# Patient Record
Sex: MALE | Race: WHITE | HISPANIC OR LATINO | Employment: FULL TIME | URBAN - METROPOLITAN AREA
[De-identification: names, ages, dates, MRNs, and addresses within clinical notes are randomized per-mention and may not be internally consistent; named-entity substitution may affect disease eponyms.]

---

## 2018-09-11 ENCOUNTER — OFFICE VISIT (OUTPATIENT)
Dept: URGENT CARE | Facility: PHYSICIAN GROUP | Age: 33
End: 2018-09-11
Payer: COMMERCIAL

## 2018-09-11 VITALS
RESPIRATION RATE: 18 BRPM | HEART RATE: 64 BPM | WEIGHT: 212 LBS | BODY MASS INDEX: 29.68 KG/M2 | OXYGEN SATURATION: 99 % | DIASTOLIC BLOOD PRESSURE: 74 MMHG | HEIGHT: 71 IN | SYSTOLIC BLOOD PRESSURE: 122 MMHG | TEMPERATURE: 98.6 F

## 2018-09-11 DIAGNOSIS — R68.89 FLU-LIKE SYMPTOMS: ICD-10-CM

## 2018-09-11 DIAGNOSIS — H66.002 ACUTE SUPPURATIVE OTITIS MEDIA OF LEFT EAR WITHOUT SPONTANEOUS RUPTURE OF TYMPANIC MEMBRANE, RECURRENCE NOT SPECIFIED: ICD-10-CM

## 2018-09-11 LAB
FLUAV+FLUBV AG SPEC QL IA: NORMAL
INT CON NEG: NEGATIVE
INT CON NEG: NEGATIVE
INT CON POS: POSITIVE
INT CON POS: POSITIVE
S PYO AG THROAT QL: NORMAL

## 2018-09-11 PROCEDURE — 99203 OFFICE O/P NEW LOW 30 MIN: CPT | Performed by: FAMILY MEDICINE

## 2018-09-11 PROCEDURE — 87804 INFLUENZA ASSAY W/OPTIC: CPT | Performed by: FAMILY MEDICINE

## 2018-09-11 PROCEDURE — 87880 STREP A ASSAY W/OPTIC: CPT | Performed by: FAMILY MEDICINE

## 2018-09-11 RX ORDER — AMOXICILLIN 500 MG/1
500 CAPSULE ORAL 3 TIMES DAILY
Qty: 30 CAP | Refills: 0 | Status: SHIPPED | OUTPATIENT
Start: 2018-09-11

## 2018-09-11 ASSESSMENT — ENCOUNTER SYMPTOMS
DIARRHEA: 0
SHORTNESS OF BREATH: 0
VOMITING: 0
FEVER: 0
CHILLS: 0
NAUSEA: 0
SORE THROAT: 1
HEADACHES: 0
MYALGIAS: 1
EYE DISCHARGE: 1
COUGH: 0
ABDOMINAL PAIN: 0

## 2018-09-11 NOTE — LETTER
September 11, 2018    To Whom It May Concern:         This is confirmation that Haroldo Bass attended an appointment with Pranay Blackwell M.D. on 9/11/18.  Please excuse him from work from 9/10-9/11.  He should be able to return to work tomorrow, 9/12/18.         If you have any questions please do not hesitate to call me at the phone number listed below.    Sincerely,          Pranay Blackwell M.D.  [phone]

## 2020-04-06 ENCOUNTER — HOSPITAL ENCOUNTER (EMERGENCY)
Facility: MEDICAL CENTER | Age: 35
End: 2020-04-06
Attending: EMERGENCY MEDICINE

## 2020-04-06 ENCOUNTER — APPOINTMENT (OUTPATIENT)
Dept: RADIOLOGY | Facility: MEDICAL CENTER | Age: 35
End: 2020-04-06
Attending: EMERGENCY MEDICINE

## 2020-04-06 VITALS
OXYGEN SATURATION: 97 % | HEART RATE: 67 BPM | RESPIRATION RATE: 20 BRPM | BODY MASS INDEX: 30.8 KG/M2 | TEMPERATURE: 97.5 F | DIASTOLIC BLOOD PRESSURE: 98 MMHG | WEIGHT: 220.02 LBS | SYSTOLIC BLOOD PRESSURE: 154 MMHG | HEIGHT: 71 IN

## 2020-04-06 DIAGNOSIS — I15.9 SECONDARY HYPERTENSION: ICD-10-CM

## 2020-04-06 DIAGNOSIS — R07.9 CHEST PAIN, UNSPECIFIED TYPE: ICD-10-CM

## 2020-04-06 LAB
ALBUMIN SERPL BCP-MCNC: 4.9 G/DL (ref 3.2–4.9)
ALBUMIN/GLOB SERPL: 1.6 G/DL
ALP SERPL-CCNC: 49 U/L (ref 30–99)
ALT SERPL-CCNC: 36 U/L (ref 2–50)
AMPHET UR QL SCN: NEGATIVE
ANION GAP SERPL CALC-SCNC: 14 MMOL/L (ref 7–16)
AST SERPL-CCNC: 25 U/L (ref 12–45)
BARBITURATES UR QL SCN: NEGATIVE
BASOPHILS # BLD AUTO: 0.6 % (ref 0–1.8)
BASOPHILS # BLD: 0.05 K/UL (ref 0–0.12)
BENZODIAZ UR QL SCN: NEGATIVE
BILIRUB SERPL-MCNC: 0.4 MG/DL (ref 0.1–1.5)
BUN SERPL-MCNC: 23 MG/DL (ref 8–22)
BZE UR QL SCN: NEGATIVE
CALCIUM SERPL-MCNC: 9.4 MG/DL (ref 8.5–10.5)
CANNABINOIDS UR QL SCN: NEGATIVE
CHLORIDE SERPL-SCNC: 101 MMOL/L (ref 96–112)
CO2 SERPL-SCNC: 24 MMOL/L (ref 20–33)
CREAT SERPL-MCNC: 0.99 MG/DL (ref 0.5–1.4)
D DIMER PPP IA.FEU-MCNC: 0.76 UG/ML (FEU) (ref 0–0.5)
EKG IMPRESSION: NORMAL
EOSINOPHIL # BLD AUTO: 0.09 K/UL (ref 0–0.51)
EOSINOPHIL NFR BLD: 1 % (ref 0–6.9)
ERYTHROCYTE [DISTWIDTH] IN BLOOD BY AUTOMATED COUNT: 35.5 FL (ref 35.9–50)
ETHANOL BLD-MCNC: <10.1 MG/DL (ref 0–10.1)
GLOBULIN SER CALC-MCNC: 3 G/DL (ref 1.9–3.5)
GLUCOSE SERPL-MCNC: 118 MG/DL (ref 65–99)
HCT VFR BLD AUTO: 45.3 % (ref 42–52)
HGB BLD-MCNC: 16.3 G/DL (ref 14–18)
IMM GRANULOCYTES # BLD AUTO: 0.02 K/UL (ref 0–0.11)
IMM GRANULOCYTES NFR BLD AUTO: 0.2 % (ref 0–0.9)
LYMPHOCYTES # BLD AUTO: 1.99 K/UL (ref 1–4.8)
LYMPHOCYTES NFR BLD: 23 % (ref 22–41)
MCH RBC QN AUTO: 30.7 PG (ref 27–33)
MCHC RBC AUTO-ENTMCNC: 36 G/DL (ref 33.7–35.3)
MCV RBC AUTO: 85.3 FL (ref 81.4–97.8)
METHADONE UR QL SCN: NEGATIVE
MONOCYTES # BLD AUTO: 0.52 K/UL (ref 0–0.85)
MONOCYTES NFR BLD AUTO: 6 % (ref 0–13.4)
NEUTROPHILS # BLD AUTO: 5.97 K/UL (ref 1.82–7.42)
NEUTROPHILS NFR BLD: 69.2 % (ref 44–72)
NRBC # BLD AUTO: 0 K/UL
NRBC BLD-RTO: 0 /100 WBC
OPIATES UR QL SCN: NEGATIVE
OXYCODONE UR QL SCN: NEGATIVE
PCP UR QL SCN: NEGATIVE
PLATELET # BLD AUTO: 197 K/UL (ref 164–446)
PMV BLD AUTO: 11.5 FL (ref 9–12.9)
POTASSIUM SERPL-SCNC: 3.6 MMOL/L (ref 3.6–5.5)
PROPOXYPH UR QL SCN: NEGATIVE
PROT SERPL-MCNC: 7.9 G/DL (ref 6–8.2)
RBC # BLD AUTO: 5.31 M/UL (ref 4.7–6.1)
SODIUM SERPL-SCNC: 139 MMOL/L (ref 135–145)
TROPONIN T SERPL-MCNC: <6 NG/L (ref 6–19)
WBC # BLD AUTO: 8.6 K/UL (ref 4.8–10.8)

## 2020-04-06 PROCEDURE — 85379 FIBRIN DEGRADATION QUANT: CPT

## 2020-04-06 PROCEDURE — 71275 CT ANGIOGRAPHY CHEST: CPT

## 2020-04-06 PROCEDURE — 80053 COMPREHEN METABOLIC PANEL: CPT

## 2020-04-06 PROCEDURE — 93005 ELECTROCARDIOGRAM TRACING: CPT | Performed by: EMERGENCY MEDICINE

## 2020-04-06 PROCEDURE — 700117 HCHG RX CONTRAST REV CODE 255: Performed by: EMERGENCY MEDICINE

## 2020-04-06 PROCEDURE — 99285 EMERGENCY DEPT VISIT HI MDM: CPT

## 2020-04-06 PROCEDURE — 80307 DRUG TEST PRSMV CHEM ANLYZR: CPT

## 2020-04-06 PROCEDURE — 93005 ELECTROCARDIOGRAM TRACING: CPT

## 2020-04-06 PROCEDURE — 84484 ASSAY OF TROPONIN QUANT: CPT

## 2020-04-06 PROCEDURE — 85025 COMPLETE CBC W/AUTO DIFF WBC: CPT

## 2020-04-06 PROCEDURE — 71045 X-RAY EXAM CHEST 1 VIEW: CPT

## 2020-04-06 RX ORDER — LISINOPRIL 10 MG/1
10 TABLET ORAL DAILY
Qty: 15 TAB | Refills: 0 | Status: SHIPPED | OUTPATIENT
Start: 2020-04-06 | End: 2023-03-31 | Stop reason: SDUPTHER

## 2020-04-06 RX ADMIN — IOHEXOL 56 ML: 350 INJECTION, SOLUTION INTRAVENOUS at 03:22

## 2020-04-06 SDOH — HEALTH STABILITY: MENTAL HEALTH: HOW OFTEN DO YOU HAVE A DRINK CONTAINING ALCOHOL?: MONTHLY OR LESS

## 2020-04-06 ASSESSMENT — LIFESTYLE VARIABLES
DO YOU DRINK ALCOHOL: NO
DOES PATIENT WANT TO STOP DRINKING: NO

## 2020-04-06 NOTE — ED TRIAGE NOTES
"Haroldo Roldan  34 y.o.  Chief Complaint   Patient presents with   • Hypertension     pt HX of HTN, per pt MD told him to stop taking Lisinipril.  Pt been off medication for 1 month.    • Chest Pressure     \"tightness\"       [facility].  Pt HX of HTN pt PCP told him to stop taking prescribed 10mg Lisinopril daily, 1 month ago.  Pt states yesterday he was \"feeling not well\" and took BP at home and it was elevated so he too his wife's dose of 20 mg table at 1300 yesterday, still not feeling well around 2330 last night and retook BP-remained lei and took another 20mg tablet.  Pt presents now Hypertensive and c/o chest tightness, slighly diaphoretic.  EKG complete, protocols ordered.     Vitals:    04/06/20 0129   BP: (!) 186/111   Pulse: 72   Resp: 18   Temp: 36.4 °C (97.5 °F)   SpO2: 97%       Triage process explained to patient, apologized for wait time, and returned to lobby.  Pt informed to notify staff of any change in condition. NAD at this time.    "

## 2020-04-06 NOTE — ED PROVIDER NOTES
"ER Provider Note     Scribed for Yonatan Hanks M.D. by Librado Angelo. 4/6/2020, 1:59 AM.    Primary Care Provider: Pcp Pt States None  Means of Arrival: Walk-in   History obtained from: Patient  History limited by: None     CHIEF COMPLAINT  Chief Complaint   Patient presents with   • Hypertension     pt HX of HTN, per pt MD told him to stop taking Lisinipril.  Pt been off medication for 1 month.    • Chest Pressure     \"tightness\"       HPI  Haroldo Roldan is a 34 y.o. male who presents to the Emergency Department for acute, moderate chest pain onset 1 day ago. Patient states that 1 day ago he began to experience chest pain described as tightness. He has a history of being previously treated with lisinopril for hypertension, so he took his BP at home and took 20 mg of his wife's lisinopril after he noticed it was high, although it provided no alleviation. He took another tablet last night but his BP still did not respond. Denies any associated leg swelling, fever, chills, or cough. He additionally birgit any drug usage or smoking.     PPE Note: I personally donned full PPE for all patient encounters during this visit, including being clean-shaven with an N95 respirator mask and gloves.     Scribe remained outside the patient's room and did not have any contact with the patient for the duration of patient encounter.     REVIEW OF SYSTEMS  See HPI for further details. All other systems are negative.     PAST MEDICAL HISTORY   has a past medical history of Hypertension.    SURGICAL HISTORY  patient denies any surgical history    SOCIAL HISTORY  Social History     Tobacco Use   • Smoking status: Never Smoker   • Smokeless tobacco: Never Used   Substance Use Topics   • Alcohol use: Yes     Alcohol/week: 1.8 oz     Types: 3 Shots of liquor per week     Frequency: Monthly or less   • Drug use: Never      Social History     Substance and Sexual Activity   Drug Use Never       FAMILY HISTORY  History " "reviewed. No pertinent family history.    CURRENT MEDICATIONS  Home Medications     Reviewed by Ching Barnes R.N. (Registered Nurse) on 04/06/20 at 0137  Med List Status: Partial   Medication Last Dose Status   amoxicillin (AMOXIL) 500 MG Cap  Active                ALLERGIES  No Known Allergies    PHYSICAL EXAM  VITAL SIGNS: BP (!) 186/111   Pulse 72   Temp 36.4 °C (97.5 °F) (Oral)   Resp 18   Ht 1.803 m (5' 11\")   Wt 99.8 kg (220 lb 0.3 oz)   SpO2 97%   BMI 30.69 kg/m²      Constitutional: Alert in no apparent distress.  HENT: No signs of trauma, Bilateral external ears normal, Nose normal.   Eyes: Pupils are equal and reactive, Conjunctiva normal, Non-icteric.   Neck: Normal range of motion, No tenderness, Supple, No stridor.   Lymphatic: No lymphadenopathy noted.   Cardiovascular: Regular rate and rhythm, no murmurs.   Thorax & Lungs: Normal breath sounds, No respiratory distress, No wheezing, No chest tenderness.   Abdomen: Bowel sounds normal, Soft, No tenderness, No masses, No pulsatile masses. No peritoneal signs.  Skin: Warm, Dry, No erythema, No rash.   Back: No bony tenderness, No CVA tenderness.   Extremities: Intact distal pulses, No edema, No tenderness, No cyanosis.  Musculoskeletal: Good range of motion in all major joints. No tenderness to palpation or major deformities noted.   Neurologic: Alert , Normal motor function, Normal sensory function, No focal deficits noted.   Psychiatric: Affect normal, Judgment normal, Mood normal.     DIAGNOSTIC STUDIES / PROCEDURES    EKG Interpretation:  Interpreted by me    12 Lead EKG interpreted by me to show:  Normal sinus rhythm  Rate 74  Axis: Normal  Intervals: Normal  Normal T waves  Non-diagnostic ST elevation, likely early repolarization.  RSR' in leads V1 and V2  My impression of this EKG: Does not indicate ischemia or arrhythmia at this time.     LABS  Labs Reviewed   CBC WITH DIFFERENTIAL - Abnormal; Notable for the following components:       " Result Value    MCHC 36.0 (*)     RDW 35.5 (*)     All other components within normal limits   COMP METABOLIC PANEL - Abnormal; Notable for the following components:    Glucose 118 (*)     Bun 23 (*)     All other components within normal limits   D-DIMER - Abnormal; Notable for the following components:    D-Dimer Screen 0.76 (*)     All other components within normal limits   TROPONIN   ESTIMATED GFR   DIAGNOSTIC ALCOHOL   URINE DRUG SCREEN     All labs reviewed by me.    RADIOLOGY  CT-CTA CHEST PULMONARY ARTERY W/ RECONS   Final Result      1.  No evidence of central or large peripheral pulmonary embolism. Due to suboptimal contrast bolus timing, smaller peripheral and subsegmental pulmonary emboli cannot be excluded on the basis of this study.   2.  No acute abnormality of the chest.   3.  Hepatic steatosis.   4.  Multiple bilateral renal cysts.            DX-CHEST-PORTABLE (1 VIEW)   Final Result      Mild enlargement of the cardiomediastinal silhouette without acute cardiopulmonary abnormality.         The radiologist's interpretation of all radiological studies have been reviewed by me.    COURSE & MEDICAL DECISION MAKING  Pertinent Labs & Imaging studies reviewed. (See chart for details)    This is a 34 y.o. male that presents with hypertension as well as some mild chest pressure.  The chest pressure is going on for more than 6 hours therefore a single troponin will be adequate.  We will get a chest x-ray as well to evaluate for no pneumothorax.  We will get a d-dimer to evaluate for potential clot.  I will reassess the patient after this..     1:59 AM - Patient seen and examined at bedside. Ordered DX-chest, diagnostic alcohol, D-dimer, estimated GFR, CBC with diff, CMP, troponin, and EKG.      2:57 AM - Ordered CT-CTA chest pulmonary per elevated D-dimer.    4:14 AM - Patient was reevaluated at bedside. Discussed lab and radiology results with the patient and informed them they are reassuring. Return  precautions were discussed with the patient, and they were cleared for discharge at this time. Patient was understanding and agreeable to discharge.       The patient will return for new or worsening symptoms and is stable at the time of discharge.    No obvious ischemia on the EKG.  The d-dimer is elevated.  A CT scan of the chest is negative.  Chest x-ray is negative.  We will discharge the patient home with a refill on his hypertensive medications and follow-up.    The patient is referred to a primary physician for blood pressure management, diabetic screening, and for all other preventative health concerns.    DISPOSITION:  Patient will be discharged home in stable condition.    FOLLOW UP:  [address] [address]  [phone]  Go in 2 days        OUTPATIENT MEDICATIONS:  New Prescriptions    No medications on file       FINAL IMPRESSION  1. Chest pain, unspecified type    2. Secondary hypertension          Librado DARBY ([place]), am scribing for, and in the presence of, Yonatan Hanks M.D..    Electronically signed by: Librado Angelo ([place]), 4/6/2020    Yonatan DARBY M.D. personally performed the services described in this documentation, as scribed by Librado Angelo in my presence, and it is both accurate and complete. C.    The note accurately reflects work and decisions made by me.  Yonatan Hanks M.D.  4/6/2020  5:24 AM

## 2020-04-06 NOTE — ED NOTES
Patient ambulated to room without concern and stable gate. Patient reports that his PCP told him that he may not need his bp medication and he stopped taking it about a month ago. Patient reports headache and chest pressure that started this am.

## 2023-03-31 ENCOUNTER — HOSPITAL ENCOUNTER (EMERGENCY)
Facility: MEDICAL CENTER | Age: 38
End: 2023-03-31
Attending: EMERGENCY MEDICINE

## 2023-03-31 VITALS
WEIGHT: 225.53 LBS | RESPIRATION RATE: 20 BRPM | HEIGHT: 71 IN | HEART RATE: 101 BPM | DIASTOLIC BLOOD PRESSURE: 97 MMHG | SYSTOLIC BLOOD PRESSURE: 167 MMHG | TEMPERATURE: 99.1 F | OXYGEN SATURATION: 94 % | BODY MASS INDEX: 31.57 KG/M2

## 2023-03-31 DIAGNOSIS — I16.0 HYPERTENSIVE URGENCY: ICD-10-CM

## 2023-03-31 LAB
ALBUMIN SERPL BCP-MCNC: 4.6 G/DL (ref 3.2–4.9)
ALBUMIN/GLOB SERPL: 1.6 G/DL
ALP SERPL-CCNC: 53 U/L (ref 30–99)
ALT SERPL-CCNC: 25 U/L (ref 2–50)
AMPHET UR QL SCN: NEGATIVE
ANION GAP SERPL CALC-SCNC: 12 MMOL/L (ref 7–16)
AST SERPL-CCNC: 21 U/L (ref 12–45)
BARBITURATES UR QL SCN: NEGATIVE
BASOPHILS # BLD AUTO: 0.4 % (ref 0–1.8)
BASOPHILS # BLD: 0.05 K/UL (ref 0–0.12)
BENZODIAZ UR QL SCN: NEGATIVE
BILIRUB SERPL-MCNC: 0.5 MG/DL (ref 0.1–1.5)
BUN SERPL-MCNC: 25 MG/DL (ref 8–22)
BZE UR QL SCN: NEGATIVE
CALCIUM ALBUM COR SERPL-MCNC: 8.9 MG/DL (ref 8.5–10.5)
CALCIUM SERPL-MCNC: 9.4 MG/DL (ref 8.5–10.5)
CANNABINOIDS UR QL SCN: NEGATIVE
CHLORIDE SERPL-SCNC: 102 MMOL/L (ref 96–112)
CO2 SERPL-SCNC: 23 MMOL/L (ref 20–33)
CREAT SERPL-MCNC: 0.99 MG/DL (ref 0.5–1.4)
EKG IMPRESSION: NORMAL
EOSINOPHIL # BLD AUTO: 0.04 K/UL (ref 0–0.51)
EOSINOPHIL NFR BLD: 0.3 % (ref 0–6.9)
ERYTHROCYTE [DISTWIDTH] IN BLOOD BY AUTOMATED COUNT: 35.1 FL (ref 35.9–50)
GFR SERPLBLD CREATININE-BSD FMLA CKD-EPI: 99 ML/MIN/1.73 M 2
GLOBULIN SER CALC-MCNC: 2.9 G/DL (ref 1.9–3.5)
GLUCOSE SERPL-MCNC: 125 MG/DL (ref 65–99)
HCT VFR BLD AUTO: 42.5 % (ref 42–52)
HGB BLD-MCNC: 15.5 G/DL (ref 14–18)
IMM GRANULOCYTES # BLD AUTO: 0.05 K/UL (ref 0–0.11)
IMM GRANULOCYTES NFR BLD AUTO: 0.4 % (ref 0–0.9)
LYMPHOCYTES # BLD AUTO: 1.31 K/UL (ref 1–4.8)
LYMPHOCYTES NFR BLD: 10.9 % (ref 22–41)
MCH RBC QN AUTO: 30 PG (ref 27–33)
MCHC RBC AUTO-ENTMCNC: 36.5 G/DL (ref 33.7–35.3)
MCV RBC AUTO: 82.2 FL (ref 81.4–97.8)
METHADONE UR QL SCN: NEGATIVE
MONOCYTES # BLD AUTO: 0.51 K/UL (ref 0–0.85)
MONOCYTES NFR BLD AUTO: 4.3 % (ref 0–13.4)
NEUTROPHILS # BLD AUTO: 10.04 K/UL (ref 1.82–7.42)
NEUTROPHILS NFR BLD: 83.7 % (ref 44–72)
NRBC # BLD AUTO: 0 K/UL
NRBC BLD-RTO: 0 /100 WBC
NT-PROBNP SERPL IA-MCNC: 37 PG/ML (ref 0–125)
OPIATES UR QL SCN: NEGATIVE
OXYCODONE UR QL SCN: NEGATIVE
PCP UR QL SCN: NEGATIVE
PLATELET # BLD AUTO: 196 K/UL (ref 164–446)
PMV BLD AUTO: 11.3 FL (ref 9–12.9)
POTASSIUM SERPL-SCNC: 3.3 MMOL/L (ref 3.6–5.5)
PROPOXYPH UR QL SCN: NEGATIVE
PROT SERPL-MCNC: 7.5 G/DL (ref 6–8.2)
RBC # BLD AUTO: 5.17 M/UL (ref 4.7–6.1)
SODIUM SERPL-SCNC: 137 MMOL/L (ref 135–145)
TROPONIN T SERPL-MCNC: <6 NG/L (ref 6–19)
WBC # BLD AUTO: 12 K/UL (ref 4.8–10.8)

## 2023-03-31 PROCEDURE — 94760 N-INVAS EAR/PLS OXIMETRY 1: CPT

## 2023-03-31 PROCEDURE — 83880 ASSAY OF NATRIURETIC PEPTIDE: CPT

## 2023-03-31 PROCEDURE — 93005 ELECTROCARDIOGRAM TRACING: CPT | Performed by: EMERGENCY MEDICINE

## 2023-03-31 PROCEDURE — 99284 EMERGENCY DEPT VISIT MOD MDM: CPT

## 2023-03-31 PROCEDURE — 96374 THER/PROPH/DIAG INJ IV PUSH: CPT

## 2023-03-31 PROCEDURE — 80307 DRUG TEST PRSMV CHEM ANLYZR: CPT

## 2023-03-31 PROCEDURE — 84484 ASSAY OF TROPONIN QUANT: CPT

## 2023-03-31 PROCEDURE — 93005 ELECTROCARDIOGRAM TRACING: CPT

## 2023-03-31 PROCEDURE — 80053 COMPREHEN METABOLIC PANEL: CPT

## 2023-03-31 PROCEDURE — 36415 COLL VENOUS BLD VENIPUNCTURE: CPT

## 2023-03-31 PROCEDURE — 700111 HCHG RX REV CODE 636 W/ 250 OVERRIDE (IP): Performed by: EMERGENCY MEDICINE

## 2023-03-31 PROCEDURE — 85025 COMPLETE CBC W/AUTO DIFF WBC: CPT

## 2023-03-31 RX ORDER — HYDRALAZINE HYDROCHLORIDE 20 MG/ML
20 INJECTION INTRAMUSCULAR; INTRAVENOUS ONCE
Status: COMPLETED | OUTPATIENT
Start: 2023-03-31 | End: 2023-03-31

## 2023-03-31 RX ORDER — LISINOPRIL 10 MG/1
10 TABLET ORAL DAILY
Qty: 30 TABLET | Refills: 0 | Status: SHIPPED | OUTPATIENT
Start: 2023-03-31 | End: 2023-04-30

## 2023-03-31 RX ADMIN — HYDRALAZINE HYDROCHLORIDE 20 MG: 20 INJECTION INTRAMUSCULAR; INTRAVENOUS at 02:53

## 2023-03-31 NOTE — ED PROVIDER NOTES
ED Provider Note    CHIEF COMPLAINT  Chief Complaint   Patient presents with    Hypertension     Patient ambulates to triage c/o htn reports bp at home to be 175/115, patient has a history of high blood pressure takes hydralazine. Reports he took his medications tonight. Reports chest pain.        EXTERNAL RECORDS REVIEWED  Other previous emergency department notes    HPI/ROS  LIMITATION TO HISTORY   Select: : None  OUTSIDE HISTORIAN(S):      Haroldo Roldan is a 37 y.o. male who presents chief complaint hypertension.  Patient has a history of hypertension for which he takes hydralazine.  Was also previously on lisinopril but had stopped taking it.  Yesterday he had noticed that his blood pressure was high he took an extra one of his hydralazine he also took one of his wife's lisinopril.  Throughout the day today his pressures continue to be elevated and has had some tightness in his chest some minimal shortness of breath.  No nausea no diaphoresis no abdominal pain no problems with urination or bowel movements no other acute symptom changes or concerns.    PAST MEDICAL HISTORY   has a past medical history of Hypertension.    SURGICAL HISTORY  patient denies any surgical history    FAMILY HISTORY  No family history on file.    SOCIAL HISTORY  Social History     Tobacco Use    Smoking status: Never    Smokeless tobacco: Never   Substance and Sexual Activity    Alcohol use: Yes     Alcohol/week: 1.8 oz     Types: 3 Shots of liquor per week    Drug use: Never    Sexual activity: Not on file       CURRENT MEDICATIONS  Home Medications       Reviewed by Tia Moran R.N. (Registered Nurse) on 03/31/23 at 0143  Med List Status: Not Addressed     Medication Last Dose Status   amoxicillin (AMOXIL) 500 MG Cap  Active   hydrALAZINE (APRESOLINE) 25 MG Tab  Active   lisinopril (PRINIVIL) 10 MG Tab  Active                    ALLERGIES  No Known Allergies    PHYSICAL EXAM  VITAL SIGNS: BP (!) 192/106   Pulse 82  "  Temp 36.3 °C (97.3 °F) (Temporal)   Resp 16   Ht 1.803 m (5' 11\")   Wt 102 kg (225 lb 8.5 oz)   SpO2 94%   BMI 31.46 kg/m²    Pulse ox interpretation: I interpret this pulse ox as normal.  Constitutional: Alert and oriented x 3, minimal distress  HEENT: Atraumatic normocephalic, pupils are equal round reactive to light extraocular movements are intact. The nares is clear, external ears are normal, mouth shows moist mucous membranes normal dentition for age  Neck: Supple, no JVD no tracheal deviation  Cardiovascular: Regular rate and rhythm no murmur rub or gallop 2+ pulses peripherally x4  Thorax & Lungs: No respiratory distress, no wheezes rales or rhonchi, No chest tenderness.   GI: Soft nontender nondistended positive bowel sounds, no peritoneal signs  Skin: Warm dry no acute rash or lesion  Musculoskeletal: Moving all extremities with full range and 5 of 5 strength no acute  deformity  Neurologic: Cranial nerves III through XII are grossly intact no sensory deficit no cerebellar dysfunction   Psychiatric: Appropriate affect for situation at this time          DIAGNOSTIC STUDIES / PROCEDURES  Results for orders placed or performed during the hospital encounter of 03/31/23   CBC w/ Differential   Result Value Ref Range    WBC 12.0 (H) 4.8 - 10.8 K/uL    RBC 5.17 4.70 - 6.10 M/uL    Hemoglobin 15.5 14.0 - 18.0 g/dL    Hematocrit 42.5 42.0 - 52.0 %    MCV 82.2 81.4 - 97.8 fL    MCH 30.0 27.0 - 33.0 pg    MCHC 36.5 (H) 33.7 - 35.3 g/dL    RDW 35.1 (L) 35.9 - 50.0 fL    Platelet Count 196 164 - 446 K/uL    MPV 11.3 9.0 - 12.9 fL    Neutrophils-Polys 83.70 (H) 44.00 - 72.00 %    Lymphocytes 10.90 (L) 22.00 - 41.00 %    Monocytes 4.30 0.00 - 13.40 %    Eosinophils 0.30 0.00 - 6.90 %    Basophils 0.40 0.00 - 1.80 %    Immature Granulocytes 0.40 0.00 - 0.90 %    Nucleated RBC 0.00 /100 WBC    Neutrophils (Absolute) 10.04 (H) 1.82 - 7.42 K/uL    Lymphs (Absolute) 1.31 1.00 - 4.80 K/uL    Monos (Absolute) 0.51 0.00 " - 0.85 K/uL    Eos (Absolute) 0.04 0.00 - 0.51 K/uL    Baso (Absolute) 0.05 0.00 - 0.12 K/uL    Immature Granulocytes (abs) 0.05 0.00 - 0.11 K/uL    NRBC (Absolute) 0.00 K/uL   Complete Metabolic Panel (CMP)   Result Value Ref Range    Sodium 137 135 - 145 mmol/L    Potassium 3.3 (L) 3.6 - 5.5 mmol/L    Chloride 102 96 - 112 mmol/L    Co2 23 20 - 33 mmol/L    Anion Gap 12.0 7.0 - 16.0    Glucose 125 (H) 65 - 99 mg/dL    Bun 25 (H) 8 - 22 mg/dL    Creatinine 0.99 0.50 - 1.40 mg/dL    Calcium 9.4 8.5 - 10.5 mg/dL    AST(SGOT) 21 12 - 45 U/L    ALT(SGPT) 25 2 - 50 U/L    Alkaline Phosphatase 53 30 - 99 U/L    Total Bilirubin 0.5 0.1 - 1.5 mg/dL    Albumin 4.6 3.2 - 4.9 g/dL    Total Protein 7.5 6.0 - 8.2 g/dL    Globulin 2.9 1.9 - 3.5 g/dL    A-G Ratio 1.6 g/dL   proBrain Natriuretic Peptide, NT   Result Value Ref Range    NT-proBNP 37 0 - 125 pg/mL   Troponin - STAT Once   Result Value Ref Range    Troponin T <6 6 - 19 ng/L   ESTIMATED GFR   Result Value Ref Range    GFR (CKD-EPI) 99 >60 mL/min/1.73 m 2   CORRECTED CALCIUM   Result Value Ref Range    Correct Calcium 8.9 8.5 - 10.5 mg/dL   EKG   Result Value Ref Range    Report       [facility] Emergency Dept.    Test Date:  2023  Pt Name:    RITA NORTH         Department: ER  MRN:        [id number]                      Room:  Gender:     Male                         Technician: [id number]  :        [id number]                   Requested By:ER TRIAGE PROTOCOL  Order #:    [id number]                    Reading MD: KIM LONDON MD    Measurements  Intervals                                Axis  Rate:       80                           P:          26  UT:         162                          QRS:        1  QRSD:       107                          T:          16  QT:         377  QTc:        435    Interpretive Statements  Normal sinus rhythm at a rate of 80 there is ST elevation limited to V2 which  is  consistent from previous exam  dated 4/6/2020.  No reciprocal changes no other  ST  deviation or T wave abnormalities no other acute findings  Electronically Signed On 3- 2:16:16 PDT by KIM LONDON MD               COURSE & MEDICAL DECISION MAKING    ED Observation Status? Yes; I am placing the patient in to an observation status due to a diagnostic uncertainty as well as therapeutic intensity. Patient placed in observation status at 02:38 AM, 3/31/2023.     Observation plan is as follows: Patient will require troponin renal function panel for evaluation of end organ damage with his hypertension otherwise likely can obtain blood pressure control and follow-up with outpatient therefore he placed in ED observation status for this evaluation    Upon Reevaluation, the patient's condition has: Improved; and will be discharged.    Patient discharged from ED Observation status at 5:10 AM   (Time) 3/31/23 (Date).     INITIAL ASSESSMENT, COURSE AND PLAN  Care Narrative: Pleasant 37-year-old male who has had multiple days of hypertension.  He takes hydralazine chronically has been taking his wife's lisinopril today took 2 of her 40 mg tablets and reports still having elevated blood pressure and some tightness in his chest.  Patient previously took lisinopril however was discontinued off this by his primary care physician as his blood pressures were on the lower end.  He has had no headache no altered mental status no neck pain no abdominal pain no lower extremity pain or swelling.  Patient was given hydralazine here for IVs had reduction of blood pressure he is feeling better at this time his labs are unremarkable including negative troponin normal renal function.  Patient to follow-up with primary care given prescription for 1 month supply of lisinopril for which he previously took and discharged in stable and improved condition        ADDITIONAL PROBLEM LIST    Anxiety, follow-up with primary care    DISPOSITION AND DISCUSSIONS    I have  discussed management of the patient with the following physicians and DIONE's:      Discussion of management with other QHP or appropriate source(s): None     Escalation of care considered, and ultimately not performed:diagnostic imaging and acute inpatient care management, however at this time, the patient is most appropriate for outpatient management    Barriers to care at this time, including but not limited to: Patient cannot recall name of his primary care physician however does state that he knows the clinic for which he has gotten care from and can contact them upon discharge.     Decision tools and prescription drugs considered including, but not limited to: Heart score 1.      [place] [address] [place] [id number]  [phone]    for establishment of primary care, for blood pressure management    [facility], Emergency Dept  [address] [facility] [address] [phone]    in 12-24 hours if symptoms persist, immediately If symptoms worsen, or if you develop any other symptoms or concerns      FINAL DIAGNOSIS  1. Hypertensive urgency Active

## 2023-03-31 NOTE — ED TRIAGE NOTES
"Chief Complaint   Patient presents with    Hypertension     Patient ambulates to triage c/o htn reports bp at home to be 175/115, patient has a history of high blood pressure takes hydralazine. Reports he took his medications tonight. Reports chest pain.      BP (!) 198/117   Pulse 75   Temp 36.3 °C (97.3 °F) (Temporal)   Resp 16   Ht 1.803 m (5' 11\")   Wt 102 kg (225 lb 8.5 oz)   SpO2 95%     EKG ordered per protocol educated on ed triage process, instructed to notify staff of any new or worsening symptoms, apologized for wait times returned to ed lobby.   "

## 2023-04-03 ENCOUNTER — HOSPITAL ENCOUNTER (EMERGENCY)
Facility: MEDICAL CENTER | Age: 38
End: 2023-04-04
Attending: EMERGENCY MEDICINE

## 2023-04-03 ENCOUNTER — APPOINTMENT (OUTPATIENT)
Dept: RADIOLOGY | Facility: MEDICAL CENTER | Age: 38
End: 2023-04-03
Attending: EMERGENCY MEDICINE

## 2023-04-03 DIAGNOSIS — I10 HYPERTENSION, UNSPECIFIED TYPE: ICD-10-CM

## 2023-04-03 LAB
ALBUMIN SERPL BCP-MCNC: 4.6 G/DL (ref 3.2–4.9)
ALBUMIN/GLOB SERPL: 1.4 G/DL
ALP SERPL-CCNC: 53 U/L (ref 30–99)
ALT SERPL-CCNC: 26 U/L (ref 2–50)
ANION GAP SERPL CALC-SCNC: 13 MMOL/L (ref 7–16)
AST SERPL-CCNC: 19 U/L (ref 12–45)
BASOPHILS # BLD AUTO: 0.3 % (ref 0–1.8)
BASOPHILS # BLD: 0.03 K/UL (ref 0–0.12)
BILIRUB SERPL-MCNC: 0.8 MG/DL (ref 0.1–1.5)
BUN SERPL-MCNC: 12 MG/DL (ref 8–22)
CALCIUM ALBUM COR SERPL-MCNC: 9 MG/DL (ref 8.5–10.5)
CALCIUM SERPL-MCNC: 9.5 MG/DL (ref 8.5–10.5)
CHLORIDE SERPL-SCNC: 101 MMOL/L (ref 96–112)
CO2 SERPL-SCNC: 24 MMOL/L (ref 20–33)
CREAT SERPL-MCNC: 0.84 MG/DL (ref 0.5–1.4)
EOSINOPHIL # BLD AUTO: 0.01 K/UL (ref 0–0.51)
EOSINOPHIL NFR BLD: 0.1 % (ref 0–6.9)
ERYTHROCYTE [DISTWIDTH] IN BLOOD BY AUTOMATED COUNT: 33.7 FL (ref 35.9–50)
GFR SERPLBLD CREATININE-BSD FMLA CKD-EPI: 113 ML/MIN/1.73 M 2
GLOBULIN SER CALC-MCNC: 3.3 G/DL (ref 1.9–3.5)
GLUCOSE SERPL-MCNC: 119 MG/DL (ref 65–99)
HCT VFR BLD AUTO: 44.3 % (ref 42–52)
HGB BLD-MCNC: 16.4 G/DL (ref 14–18)
IMM GRANULOCYTES # BLD AUTO: 0.02 K/UL (ref 0–0.11)
IMM GRANULOCYTES NFR BLD AUTO: 0.2 % (ref 0–0.9)
LACTATE SERPL-SCNC: 0.9 MMOL/L (ref 0.5–2)
LYMPHOCYTES # BLD AUTO: 1.69 K/UL (ref 1–4.8)
LYMPHOCYTES NFR BLD: 17.9 % (ref 22–41)
MCH RBC QN AUTO: 30.3 PG (ref 27–33)
MCHC RBC AUTO-ENTMCNC: 37 G/DL (ref 33.7–35.3)
MCV RBC AUTO: 81.7 FL (ref 81.4–97.8)
MONOCYTES # BLD AUTO: 0.54 K/UL (ref 0–0.85)
MONOCYTES NFR BLD AUTO: 5.7 % (ref 0–13.4)
NEUTROPHILS # BLD AUTO: 7.15 K/UL (ref 1.82–7.42)
NEUTROPHILS NFR BLD: 75.8 % (ref 44–72)
NRBC # BLD AUTO: 0 K/UL
NRBC BLD-RTO: 0 /100 WBC
PLATELET # BLD AUTO: 220 K/UL (ref 164–446)
PMV BLD AUTO: 11.1 FL (ref 9–12.9)
POTASSIUM SERPL-SCNC: 3.6 MMOL/L (ref 3.6–5.5)
PROT SERPL-MCNC: 7.9 G/DL (ref 6–8.2)
RBC # BLD AUTO: 5.42 M/UL (ref 4.7–6.1)
SODIUM SERPL-SCNC: 138 MMOL/L (ref 135–145)
TROPONIN T SERPL-MCNC: <6 NG/L (ref 6–19)
WBC # BLD AUTO: 9.4 K/UL (ref 4.8–10.8)

## 2023-04-03 PROCEDURE — 83880 ASSAY OF NATRIURETIC PEPTIDE: CPT

## 2023-04-03 PROCEDURE — 84145 PROCALCITONIN (PCT): CPT

## 2023-04-03 PROCEDURE — 80053 COMPREHEN METABOLIC PANEL: CPT

## 2023-04-03 PROCEDURE — 84484 ASSAY OF TROPONIN QUANT: CPT

## 2023-04-03 PROCEDURE — 85025 COMPLETE CBC W/AUTO DIFF WBC: CPT

## 2023-04-03 PROCEDURE — 93005 ELECTROCARDIOGRAM TRACING: CPT | Performed by: EMERGENCY MEDICINE

## 2023-04-03 PROCEDURE — A9270 NON-COVERED ITEM OR SERVICE: HCPCS | Performed by: EMERGENCY MEDICINE

## 2023-04-03 PROCEDURE — 71045 X-RAY EXAM CHEST 1 VIEW: CPT

## 2023-04-03 PROCEDURE — 700102 HCHG RX REV CODE 250 W/ 637 OVERRIDE(OP): Performed by: EMERGENCY MEDICINE

## 2023-04-03 PROCEDURE — 83605 ASSAY OF LACTIC ACID: CPT

## 2023-04-03 PROCEDURE — 99284 EMERGENCY DEPT VISIT MOD MDM: CPT

## 2023-04-03 PROCEDURE — 36415 COLL VENOUS BLD VENIPUNCTURE: CPT

## 2023-04-03 PROCEDURE — 93005 ELECTROCARDIOGRAM TRACING: CPT

## 2023-04-03 RX ORDER — LISINOPRIL 10 MG/1
10 TABLET ORAL ONCE
Status: COMPLETED | OUTPATIENT
Start: 2023-04-03 | End: 2023-04-03

## 2023-04-03 RX ORDER — HYDRALAZINE HYDROCHLORIDE 25 MG/1
50 TABLET, FILM COATED ORAL EVERY 8 HOURS
Status: DISCONTINUED | OUTPATIENT
Start: 2023-04-03 | End: 2023-04-04 | Stop reason: HOSPADM

## 2023-04-03 RX ADMIN — HYDRALAZINE HYDROCHLORIDE 50 MG: 25 TABLET, FILM COATED ORAL at 22:09

## 2023-04-03 RX ADMIN — LISINOPRIL 10 MG: 10 TABLET ORAL at 22:09

## 2023-04-03 ASSESSMENT — FIBROSIS 4 INDEX: FIB4 SCORE: 0.84

## 2023-04-04 VITALS
BODY MASS INDEX: 30.9 KG/M2 | WEIGHT: 220.68 LBS | HEART RATE: 46 BPM | SYSTOLIC BLOOD PRESSURE: 177 MMHG | DIASTOLIC BLOOD PRESSURE: 106 MMHG | RESPIRATION RATE: 17 BRPM | OXYGEN SATURATION: 93 % | TEMPERATURE: 98.1 F | HEIGHT: 71 IN

## 2023-04-04 LAB
EKG IMPRESSION: NORMAL
NT-PROBNP SERPL IA-MCNC: 20 PG/ML (ref 0–125)
PROCALCITONIN SERPL-MCNC: <0.05 NG/ML

## 2023-04-04 NOTE — ED NOTES
Pt given discharge instructions regarding follow up appointments, hypertension, and reasons to return to the ER. Pt verbalized understanding and signed AVS. Discharged in stable condition from ER

## 2023-04-04 NOTE — ED PROVIDER NOTES
ED Provider Note    CHIEF COMPLAINT  Chief Complaint   Patient presents with    Hypertension     Patient reports high blood pressure though taking his medications as prescribed. Patient reports SOB and some chest pressure since yesterday afternoon.     Shortness of Breath       HPI  Haroldo Roldan is a 37 y.o. male who presents for evaluation of high blood pressure in the setting of off-and-on symptoms consisting of anxiety and shortness of breath.  Currently, the patient has no symptoms but is concerned about his blood pressure.  He notes he was seen several days ago for the same issue and prescribed lisinopril in addition to the hydralazine he is already on.  Patient's chest pressure and shortness of breath started yesterday but at some point today it went away.  He does note that he feels anxious and is checking his blood pressure several times a day.  EXTERNAL RECORDS REVIEWED  Reviewed ED visit from 31 March ROS  Constitutional: No fevers or chills  Skin: No rashes  HEENT: No sore throat, runny nose  Neck: No neck pain  Chest: No current pain or rashes  Pulm: No current shortness of breath, cough, wheezing, stridor, or pain with inspiration/expiration  Gastrointestinal: No nausea, vomiting, diarrhea, constipation, bloating, melena, hematochezia or abdominal pain.  Genitourinary: No dysuria or hematuria  Musculoskeletal: No pain, swelling, or weakness  Neurologic: No sensory or focal motor changes to extremities. No confusion or disorientation.  Heme: No bleeding or bruising problems.   Immuno: No hx of recurrent infections        LIMITATION TO HISTORY   Mexican only speaking, arrives with younger brother who translates.  OUTSIDE HISTORIAN(S):  Younger brother        PAST FAM HISTORY  History reviewed. No pertinent family history.    PAST MEDICAL HISTORY   has a past medical history of Hypertension.    SOCIAL HISTORY  Social History     Tobacco Use    Smoking status: Never    Smokeless tobacco:  "Never   Vaping Use    Vaping Use: Never used   Substance and Sexual Activity    Alcohol use: Not Currently     Alcohol/week: 1.8 oz     Types: 3 Shots of liquor per week    Drug use: Never    Sexual activity: Not on file       SURGICAL HISTORY  patient denies any surgical history    CURRENT MEDICATIONS  Home Medications       Reviewed by Florina Irene R.N. (Registered Nurse) on 04/03/23 at 1958  Med List Status: Partial     Medication Last Dose Status   amoxicillin (AMOXIL) 500 MG Cap  Active   hydrALAZINE (APRESOLINE) 25 MG Tab  Active   lisinopril (PRINIVIL) 10 MG Tab  Active                     ALLERGIES  No Known Allergies    PHYSICAL EXAM  VITAL SIGNS: BP (!) 175/101   Pulse (!) 56   Temp 36.2 °C (97.1 °F) (Temporal)   Resp 18   Ht 1.803 m (5' 11\")   Wt 100 kg (220 lb 10.9 oz)   SpO2 92%   BMI 30.78 kg/m²    Gen: Alert in no apparent distress.  HEENT: No signs of trauma, Bilateral external ears normal, Nose normal. Conjunctiva normal, Non-icteric.   Cardiovascular: Regular rate and rhythm, no murmurs.  Capillary refill less than 3 seconds to all extremities, 2+ distal pulses.  Thorax & Lungs: Normal breath sounds, No respiratory distress, No wheezing bilateral chest rise  Abdomen: Bowel sounds normal, Soft, No tenderness, No masses, No pulsatile masses. No Guarding or rebound  Skin: Warm, Dry, No erythema, No rash noted to exposed areas.   Extremities: Intact distal pulses, No edema  Neurologic: Alert , no facial droop, grossly normal coordination and strength  Psychiatric: Affect pleasant    INITIAL IMPRESSION  Patient has for evaluation of chest pain and shortness of breath in the setting of high blood pressure.  Currently the patient actually has no symptoms and he is not quite clear when the symptoms went away.  He notes significant anxiety regarding his blood pressure and his younger brother, who came with him to help translate, states that he feels he is checking it too much and making himself " even more anxious.  Currently the patient has a normal exam and is noted to have excellent pulses and perfusion.  I do not feel his chest pain was related to dissection or MI but we will obtain screening labs once again, including a troponin.  Due to patient's recent shortness of breath I feel it is reasonable to evaluate a BNP as hypertensive urgency/emergency with congestive heart failure could be an issue with his high blood pressure.  Infectious issue seems less likely as the cause of his shortness of breath as he has no other symptoms.  Regardless, we will check a procalcitonin in addition to his CBC and a CMP.  I do not feel a repeat chest x-ray is necessary at this time however if he experiences any respiratory difficulty or decrease in his oxygen saturation we will need to reconsider this to evaluate for pulmonary edema.  Patient will be placed on ED observation as I am unclear whether therapy will work or whether he will need admission due to endorgan dysfunction  LABS  Results for orders placed or performed during the hospital encounter of 04/03/23   CBC with Differential   Result Value Ref Range    WBC 9.4 4.8 - 10.8 K/uL    RBC 5.42 4.70 - 6.10 M/uL    Hemoglobin 16.4 14.0 - 18.0 g/dL    Hematocrit 44.3 42.0 - 52.0 %    MCV 81.7 81.4 - 97.8 fL    MCH 30.3 27.0 - 33.0 pg    MCHC 37.0 (H) 33.7 - 35.3 g/dL    RDW 33.7 (L) 35.9 - 50.0 fL    Platelet Count 220 164 - 446 K/uL    MPV 11.1 9.0 - 12.9 fL    Neutrophils-Polys 75.80 (H) 44.00 - 72.00 %    Lymphocytes 17.90 (L) 22.00 - 41.00 %    Monocytes 5.70 0.00 - 13.40 %    Eosinophils 0.10 0.00 - 6.90 %    Basophils 0.30 0.00 - 1.80 %    Immature Granulocytes 0.20 0.00 - 0.90 %    Nucleated RBC 0.00 /100 WBC    Neutrophils (Absolute) 7.15 1.82 - 7.42 K/uL    Lymphs (Absolute) 1.69 1.00 - 4.80 K/uL    Monos (Absolute) 0.54 0.00 - 0.85 K/uL    Eos (Absolute) 0.01 0.00 - 0.51 K/uL    Baso (Absolute) 0.03 0.00 - 0.12 K/uL    Immature Granulocytes (abs) 0.02 0.00 -  0.11 K/uL    NRBC (Absolute) 0.00 K/uL   Complete Metabolic Panel (CMP)   Result Value Ref Range    Sodium 138 135 - 145 mmol/L    Potassium 3.6 3.6 - 5.5 mmol/L    Chloride 101 96 - 112 mmol/L    Co2 24 20 - 33 mmol/L    Anion Gap 13.0 7.0 - 16.0    Glucose 119 (H) 65 - 99 mg/dL    Bun 12 8 - 22 mg/dL    Creatinine 0.84 0.50 - 1.40 mg/dL    Calcium 9.5 8.5 - 10.5 mg/dL    AST(SGOT) 19 12 - 45 U/L    ALT(SGPT) 26 2 - 50 U/L    Alkaline Phosphatase 53 30 - 99 U/L    Total Bilirubin 0.8 0.1 - 1.5 mg/dL    Albumin 4.6 3.2 - 4.9 g/dL    Total Protein 7.9 6.0 - 8.2 g/dL    Globulin 3.3 1.9 - 3.5 g/dL    A-G Ratio 1.4 g/dL   Troponins NOW   Result Value Ref Range    Troponin T <6 6 - 19 ng/L   LACTIC ACID   Result Value Ref Range    Lactic Acid 0.9 0.5 - 2.0 mmol/L   CORRECTED CALCIUM   Result Value Ref Range    Correct Calcium 9.0 8.5 - 10.5 mg/dL   ESTIMATED GFR   Result Value Ref Range    GFR (CKD-EPI) 113 >60 mL/min/1.73 m 2   proBrain Natriuretic Peptide, NT   Result Value Ref Range    NT-proBNP 20 0 - 125 pg/mL   PROCALCITONIN   Result Value Ref Range    Procalcitonin <0.05 <0.25 ng/mL   EKG   Result Value Ref Range    Report       [facility] Emergency Dept.    Test Date:  2023  Pt Name:    RITA NORTH         Department: ER  MRN:        [id number]                      Room:  Gender:     Male                         Technician: [id number]  :        1985                   Requested By:ER TRIAGE PROTOCOL  Order #:    [id number]                    Reading MD: Andrew Logan MD    Measurements  Intervals                                Axis  Rate:       61                           P:          -2  IA:         152                          QRS:        -4  QRSD:       97                           T:          11  QT:         407  QTc:        410    Interpretive Statements  Sinus rhythm  Compared to ECG 2023 01:59:13  ST (T wave) deviation no longer present  T-wave abnormality no  longer present  Electronically Signed On 4-4-2023 0:34:55 PDT by Andrew Logan MD       I have independently interpreted this EKG  Sinus rhythm rate of 61, intervals appear to be within normal limits, there are no convincing ST or T wave changes that suggest ischemia.  There is no ectopy.  There are no significant changes when compared to EKG done 4 days ago.  RADIOLOGY  DX-CHEST-PORTABLE (1 VIEW)   Final Result         1.  No acute cardiopulmonary disease.   2.  Cardiomegaly        I have independently interpreted the diagnostic imaging associated with this visit and am waiting the final reading from the radiologist.   My preliminary interpretation is a follows: Single view chest x-ray: Slightly enlarged cardiac silhouette.  No pulmonary opacities to suggest infiltrates or effusions, no bony abnormalities.        COURSE & MEDICAL DECISION MAKING  Pertinent Labs & Imaging studies reviewed. (See chart for details)  ED observation?     Yes; I am placing the patient in to an observation status due to a diagnostic uncertainty as well as therapeutic intensity. Patient placed in observation status at 10 PM, April 3, 2023  Observation plan is as follows: Diagnostic work-up, empiric treatment for hypertension in the form of hydralazine.     Upon Reevaluation, the patient's condition has: Improved; and will be discharged.  Patient reevaluated at 12:40 AM, April 4, 2023  Patient calm, conversant, and has a blood pressure of 174/101.  It appears that the hydralazine given has had some affect.  The patient does not have any symptoms.  After discussion with the patient and his brother, who is still translating, is notable that the patient does not have any findings to suggest endorgan dysfunction.  He does have a slightly enlarged cardiac silhouette suggestive of of more longstanding disease.  This alone does not warrant admission as he does not have any findings to suggest cardiac dysfunction at this time.  Patient states  understanding of this and noted that he has an appointment with his doctor tomorrow.  I feel he is safe for discharge and continuation of his current medications which includes lisinopril and hydralazine, until he can follow-up.  He states clear understanding that if his symptoms worsen or change he can return for reevaluation.  Patient discharged from ED Observation status at 12:40 AM (Time) April 4, 2023 (Date).   In addition to the chief complaint, the following problems were addressed: None    I have discussed management of the patient with the following physicians and DIONE's: None    Escalation of care considered, and ultimately not performed:acute inpatient care management, however at this time, the patient is most appropriate for outpatient management    Barriers to care at this time, including but not limited to: Language barrier.     Decision tools and Rx drugs considered including, but not limited to : Antihypertensives    Discussion of management with other QHP or appropriate source(s): None    The patient will not drink alcohol nor drive with prescribed medications. The patient will return for worsening symptoms and is stable at the time of discharge. The patient verbalizes understanding and will comply.    FINAL IMPRESSION  1. Hypertension, unspecified type        Electronically signed by: Andrew Logan M.D., 4/3/2023 9:54 PM;javi

## 2025-03-06 ENCOUNTER — OFFICE VISIT (OUTPATIENT)
Dept: URGENT CARE | Facility: PHYSICIAN GROUP | Age: 40
End: 2025-03-06

## 2025-03-06 VITALS
TEMPERATURE: 97.5 F | WEIGHT: 220.4 LBS | HEART RATE: 65 BPM | SYSTOLIC BLOOD PRESSURE: 148 MMHG | RESPIRATION RATE: 16 BRPM | HEIGHT: 72 IN | OXYGEN SATURATION: 97 % | BODY MASS INDEX: 29.85 KG/M2 | DIASTOLIC BLOOD PRESSURE: 90 MMHG

## 2025-03-06 DIAGNOSIS — R10.13 EPIGASTRIC PAIN: ICD-10-CM

## 2025-03-06 DIAGNOSIS — R11.0 NAUSEA: ICD-10-CM

## 2025-03-06 PROCEDURE — 3080F DIAST BP >= 90 MM HG: CPT

## 2025-03-06 PROCEDURE — 3077F SYST BP >= 140 MM HG: CPT

## 2025-03-06 PROCEDURE — 99213 OFFICE O/P EST LOW 20 MIN: CPT

## 2025-03-06 RX ORDER — AMLODIPINE BESYLATE 5 MG/1
5 TABLET ORAL
COMMUNITY

## 2025-03-06 RX ORDER — LISINOPRIL 40 MG/1
TABLET ORAL
COMMUNITY

## 2025-03-06 RX ORDER — OMEPRAZOLE 20 MG/1
40 CAPSULE, DELAYED RELEASE ORAL DAILY
Qty: 28 CAPSULE | Refills: 0 | Status: SHIPPED | OUTPATIENT
Start: 2025-03-06 | End: 2025-03-20

## 2025-03-06 RX ORDER — ONDANSETRON 4 MG/1
4 TABLET, ORALLY DISINTEGRATING ORAL EVERY 6 HOURS PRN
Qty: 15 TABLET | Refills: 0 | Status: SHIPPED | OUTPATIENT
Start: 2025-03-06

## 2025-03-06 ASSESSMENT — ENCOUNTER SYMPTOMS
ABDOMINAL PAIN: 1
NAUSEA: 1
FEVER: 0
VOMITING: 1
CHILLS: 0
COUGH: 1

## 2025-03-06 ASSESSMENT — FIBROSIS 4 INDEX: FIB4 SCORE: 0.69

## 2025-03-07 ASSESSMENT — ENCOUNTER SYMPTOMS
WEAKNESS: 0
SWOLLEN GLANDS: 0
FATIGUE: 0
DIZZINESS: 0
SHORTNESS OF BREATH: 0
DIARRHEA: 0
HEADACHES: 0

## 2025-03-07 NOTE — PROGRESS NOTES
Subjective     Haroldo Roldan is a 39 y.o. male who presents with GI Problem (Stomach pain, nauseas,bloating  x 4 days )            Nausea  This is a new problem. The problem has been gradually improving. Associated symptoms include abdominal pain, congestion, coughing, nausea and vomiting. Pertinent negatives include no chest pain, chills, fatigue, fever, headaches, rash, swollen glands or weakness. Associated symptoms comments: Diarrhea. The symptoms are aggravated by eating. Treatments tried: pepto, alkaseltzer, tea. The treatment provided mild relief.       Review of Systems   Constitutional:  Negative for chills, fatigue, fever and malaise/fatigue.   HENT:  Positive for congestion.    Respiratory:  Positive for cough. Negative for shortness of breath.    Cardiovascular:  Negative for chest pain.   Gastrointestinal:  Positive for abdominal pain, nausea and vomiting. Negative for diarrhea.   Skin:  Negative for rash.   Neurological:  Negative for dizziness, weakness and headaches.   All other systems reviewed and are negative.             Objective     BP (!) 148/90   Pulse 65   Temp 36.4 °C (97.5 °F) (Temporal)   Resp 16   Ht 1.829 m (6')   Wt 100 kg (220 lb 6.4 oz)   SpO2 97%   BMI 29.89 kg/m²      Physical Exam  Vitals reviewed.   Constitutional:       Appearance: Normal appearance.   HENT:      Head: Normocephalic and atraumatic.      Nose: Nose normal.   Eyes:      Conjunctiva/sclera: Conjunctivae normal.   Cardiovascular:      Rate and Rhythm: Normal rate.   Pulmonary:      Effort: Pulmonary effort is normal.   Abdominal:      General: Abdomen is flat. There is no distension.      Palpations: Abdomen is soft. There is no mass.      Tenderness: There is generalized abdominal tenderness and tenderness in the epigastric area. There is no right CVA tenderness, left CVA tenderness or guarding.   Musculoskeletal:         General: Normal range of motion.   Skin:     General: Skin is warm and  dry.   Neurological:      Mental Status: He is alert and oriented to person, place, and time.   Psychiatric:         Behavior: Behavior normal.         Judgment: Judgment normal.                                  Assessment & Plan  Nausea    Orders:    ondansetron (ZOFRAN ODT) 4 MG TABLET DISPERSIBLE; Take 1 Tablet by mouth every 6 hours as needed for Nausea/Vomiting for up to 15 doses.    Epigastric pain    Orders:    omeprazole (PRILOSEC) 20 MG delayed-release capsule; Take 2 Capsules by mouth every day for 14 days.    ondansetron (ZOFRAN ODT) 4 MG TABLET DISPERSIBLE; Take 1 Tablet by mouth every 6 hours as needed for Nausea/Vomiting for up to 15 doses.    Discussed with Haroldo some differentials which may include but are not limited to, a viral illness, GERD, peptic ulcer disease.    Will treat with Zofran as needed for nausea and a 2-week course of omeprazole.  If symptoms worsen or do not improve or present to the ER.    Shared decision-making was utilized with Haroldo for plan of care. Discussed differential diagnoses, natural history, and supportive care. Reviewed indication for use and the potential benefits and side effects of medications. Haroldo was encouraged to monitor symptoms closely and educated about signs and symptoms that would warrant concern and mandate seeking a higher level of service through the emergency department discussed at length. All questions answered to Haroldo's satisfaction and they were in agreement with treatment plan at time of discharge.